# Patient Record
Sex: FEMALE | Race: WHITE
[De-identification: names, ages, dates, MRNs, and addresses within clinical notes are randomized per-mention and may not be internally consistent; named-entity substitution may affect disease eponyms.]

---

## 2022-10-12 ENCOUNTER — HOSPITAL ENCOUNTER (EMERGENCY)
Dept: HOSPITAL 46 - ED | Age: 57
Discharge: HOME | End: 2022-10-12
Payer: COMMERCIAL

## 2022-10-12 VITALS — HEIGHT: 62 IN | BODY MASS INDEX: 29.92 KG/M2 | WEIGHT: 162.56 LBS

## 2022-10-12 DIAGNOSIS — R10.10: Primary | ICD-10-CM

## 2022-10-12 DIAGNOSIS — Z88.8: ICD-10-CM

## 2022-10-12 DIAGNOSIS — R07.89: ICD-10-CM

## 2022-10-12 NOTE — EKG
[facility]
                                    [address] [facility]
                                  [place], [place]  [address]
_________________________________________________________________________________________
                                                                 Signed   
 
 
Normal sinus rhythm
Normal ECG
No previous ECGs available
Confirmed by GEMMA MCKEON MD ([id number]) on 10/12/2022 8:51:53 PM
 
 
 
 
 
 
 
 
 
 
 
 
 
 
 
 
 
 
 
 
 
 
 
 
 
 
 
 
 
 
 
 
 
 
 
 
 
 
 
 
 
    Electronically Signed By: GEMMA MCKEON MD  10/12/22 2052
_________________________________________________________________________________________
PATIENT NAME:     JAH FUENTES                  
MEDICAL RECORD #: [id number]                     Electrocardiogram             
          ACCT #: [id number]  
DATE OF BIRTH:   [birth year]                                       
PHYSICIAN:   GEMMA MCKEON MD                   REPORT #: [id number]
REPORT IS CONFIDENTIAL AND NOT TO BE RELEASED WITHOUT AUTHORIZATION

## 2022-12-30 ENCOUNTER — HOSPITAL ENCOUNTER (OUTPATIENT)
Dept: HOSPITAL 46 - DS | Age: 57
Discharge: HOME | End: 2022-12-30
Attending: SURGERY
Payer: COMMERCIAL

## 2022-12-30 VITALS — BODY MASS INDEX: 29.32 KG/M2 | WEIGHT: 159.33 LBS | HEIGHT: 62 IN

## 2022-12-30 DIAGNOSIS — K62.1: ICD-10-CM

## 2022-12-30 DIAGNOSIS — K63.5: ICD-10-CM

## 2022-12-30 DIAGNOSIS — K57.30: ICD-10-CM

## 2022-12-30 DIAGNOSIS — G35: ICD-10-CM

## 2022-12-30 DIAGNOSIS — R19.5: ICD-10-CM

## 2022-12-30 DIAGNOSIS — K64.8: ICD-10-CM

## 2022-12-30 DIAGNOSIS — K29.70: Primary | ICD-10-CM

## 2022-12-30 PROCEDURE — 0DB68ZX EXCISION OF STOMACH, VIA NATURAL OR ARTIFICIAL OPENING ENDOSCOPIC, DIAGNOSTIC: ICD-10-PCS | Performed by: SURGERY

## 2022-12-30 PROCEDURE — 0DBP8ZX EXCISION OF RECTUM, VIA NATURAL OR ARTIFICIAL OPENING ENDOSCOPIC, DIAGNOSTIC: ICD-10-PCS | Performed by: SURGERY

## 2022-12-30 NOTE — NUR
PT ALERT, ORIENTED AND SUPPORTED BY HER  AMPARO. PT HAS HAD SCOPES
BEFORE, HOPING TO FIND OUT THE SOURCE OF HER DISCOMFORT. AMPARO WILL RETURN
FOR DC. PT REQUESTED PRAYER, WILL FOLLOW

## 2022-12-30 NOTE — NUR
12/30/22 0940 Evelia Guillen
0932- PT ARRIVES TO PACU NONAROUSABLE TO NOXIOUS STIMULI. RESP EVEN
AND UNLABORED. OXYGEN SAT HIGH 90'S % ON 3L VIA CO2 NC.

## 2022-12-31 NOTE — OR
[facility]
                                    [address] [place], [place]  [address]
_________________________________________________________________________________________
                                                                 Signed   
 
 
DATE OF OPERATION:
12/30/2022
 
SURGEON:
Serjio Lewis MD
 
PREOPERATIVE DIAGNOSES:
1. Chronic right upper quadrant abdominal pain.
2. Guaiac-positive stool.
 
POSTOPERATIVE DIAGNOSES:
1. Moderate sized periampullary diverticulum.
2. Mild distal gastritis.
3. 3 mm rectal polyps at 6 cm.
4. Moderate sized left-sided diverticulosis.
5. 4 mm polypoid lesion at 50 cm.
6. Minimal internal hemorrhoids.
 
PROCEDURES:
1. EGD with CLOtest and biopsy of the antrum.
2. Colonoscopy with hot biopsy.
 
ESTIMATED BLOOD LOSS:
None.
 
INDICATIONS:
Daniela is a 57-year-old female asked to see me for both upper and lower endoscopy.  I
helped her in 2006 with her laparoscopic cholecystectomy.  The intraoperative
cholangiogram was unremarkable.  The common bile duct was a little dilated between 5 and
10 mm.  We saw the ampulla passed through all the duodenum quite nicely.  Overall, she
thinks she is better in that regard, but she talks about right upper quadrant abdominal
pain.  She said __________ she is able to go golfing.  She said the pain in right upper
quadrant can radiate through to her back and sometimes it is worse after she eats.  We
repeated her ultrasound in 2010 and it was unremarkable.  She had given a stool sample
and was found to be guaiac positive.  She has never had previous colonoscopy.  She told
me she is adopted and has no knowledge of her family.  No specific lower GI complaints.
In the office, I gave her a pamphlet on both upper and lower endoscopy.  We reviewed
those together in detail.  She understands there is risk including, but not limited to
gas bloating, crampy abdominal pain, bleeding, perforation requiring surgery, and missed
diagnosis.  Also, she has a long history of multiple sclerosis, but also anxiety.  She
utilizes medical marijuana along with lorazepam, naltrexone and Ambien.  Consequently,
asked for monitored anesthesia care with propofol infusion.  That proved to be a wise
 
    Electronically Signed By: SERJIO LEWIS MD  12/31/22 0833
_________________________________________________________________________________________
PATIENT NAME:     DANIELA FUENTES                  
MEDICAL RECORD #: [id number]            OPERATIVE REPORT              
          ACCT #: [id number]  
DATE OF BIRTH:   [birth year]            REPORT #: [id number]      
PHYSICIAN:        SERJIO LEWIS MD             
PCP:              JOLIE OSORIO  [facility]      
REPORT IS CONFIDENTIAL AND NOT TO BE RELEASED WITHOUT AUTHORIZATION
 
 
                                  [facility]
                                    [address] [place], [place]  [address]
_________________________________________________________________________________________
                                                                 Signed   
 
 
decision.  She had expressed understanding and wished to proceed. 
 
PROCEDURE NOTE:
Daniela was taken into our endoscopy suite and placed in the supine semi-recumbent
position.  The posterior oropharynx was anesthetized with Hurricaine spray.  A bite
block was utilized for the case.  The adult gastroscope was introduced and advanced
under direct visualization of the camera.  She was given monitored anesthesia care with
propofol per our nurse anesthetist.  The duodenum itself was unremarkable.  However, she
has a moderate sized periampullary diverticulum.  We had taken pictures throughout for
photodocumentation.  She had just very minimal inflammatory changes in her distal
stomach.  We went ahead and took a biopsy from the antrum for pathologic review as well
as CLOtest.  The incisura body and fundus of the stomach were unremarkable.  Upon
retroflexion of the scope, there was no obvious hiatal hernia.  The scope was withdrawn
up through the area of the GE junction, which was compliant without stricture.  There
were no gastric or esophageal varices.  Very minimal disruption at the Z-line.  No
Gonzalez's mucosa.  She had no distal esophagitis.  The middle and upper esophagus were
unremarkable.  After this, the gas was suctioned out and the gastroscope removed.  Daniela
tolerated the upper endoscopy quite well. 
 
Daniela was rotated into the left lateral decubitus position.  She was maintained on IV
propofol per our nurse anesthetist.  A digital rectal exam was performed and this was
unremarkable.  There were no external hemorrhoids.  She had good sphincter tone.  There
were no masses.  The adult colonoscope was introduced and advanced all around into the
cecum under direct visualization of the camera without difficulty.  It took just a
little abdominal compression to get the scope directly into the cecum itself.  Her prep
was quite excellent.  We could easily see the appendiceal orifice and the ileocecal
valve.  The scope was slowly withdrawn.  We took pictures throughout for
photodocumentation.  She has moderate left-sided diverticulosis.  They are moderate in
size, moderate in number, and scattered about.  The above-mentioned polyps were easily
removed with the help of hot biopsy forceps.  She had a polypoid lesion at 50 cm.  It
was also removed.  Once in the rectum, the scope had been retroflexed and she has just
minimal internal hemorrhoid columns.  After this, the gas was suctioned out and the
colonoscope removed.  Daniela tolerated the procedure quite well. 
 
RECOMMENDATIONS:
I will see Daniela back in my office in 7 to 14 days to review her results.
 
 
 
            ________________________________________
            Serjio Lewis MD 
 
 
    Electronically Signed By: SERJIO LEWIS MD  12/31/22 0833
_________________________________________________________________________________________
PATIENT NAME:     ALFREDODANIELA GARCIA                  
MEDICAL RECORD #: [id number]            OPERATIVE REPORT              
          ACCT #: [id number]  
DATE OF BIRTH:   [birth year]            REPORT #: [id number]      
PHYSICIAN:        SERJIO LEWIS MD             
PCP:              JOLIE OSORIO  [facility]      
REPORT IS CONFIDENTIAL AND NOT TO BE RELEASED WITHOUT AUTHORIZATION
 
 
                                  [facility]
                                    [address] [place], [place]  [address]
_________________________________________________________________________________________
                                                                 Signed   
 
 
 
ALYSSA/MODL
Job #:  [id number]
DD:  12/30/2022 09:49:25
DT:  12/30/2022 10:28:35
 
cc:            ANDI Cortez MD
 
 
Copies:  SERJIO LEWIS MD
~
 
 
 
 
 
 
 
 
 
 
 
 
 
 
 
 
 
 
 
 
 
 
 
 
 
 
 
 
 
 
    Electronically Signed By: SERJIO LEWIS MD  12/31/22 0833
_________________________________________________________________________________________
PATIENT NAME:     DANIELA FUENTES                  
MEDICAL RECORD #: [id number]            OPERATIVE REPORT              
          ACCT #: [id number]  
DATE OF BIRTH:   [birth year]            REPORT #: [id number]      
PHYSICIAN:        SERJIO LEWIS MD             
PCP:              JOLIE OSORIO  [facility]      
REPORT IS CONFIDENTIAL AND NOT TO BE RELEASED WITHOUT AUTHORIZATION